# Patient Record
Sex: MALE | Race: OTHER
[De-identification: names, ages, dates, MRNs, and addresses within clinical notes are randomized per-mention and may not be internally consistent; named-entity substitution may affect disease eponyms.]

---

## 2018-07-04 ENCOUNTER — HOSPITAL ENCOUNTER (EMERGENCY)
Dept: HOSPITAL 80 - FED | Age: 23
Discharge: HOME | End: 2018-07-04
Payer: COMMERCIAL

## 2018-07-04 VITALS — SYSTOLIC BLOOD PRESSURE: 116 MMHG | DIASTOLIC BLOOD PRESSURE: 73 MMHG
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
## 2018-07-04 DIAGNOSIS — S16.1XXA: Primary | ICD-10-CM

## 2018-07-04 NOTE — EDPHY
H & P


Stated Complaint: c/o neck and back pain, had N/V/D 2 days ago none now.


Time Seen by Provider: 07/04/18 13:55





- Personal History


Current Tetanus Diphtheria and Acellular Pertussis (TDAP): Yes





- Medical/Surgical History


Hx Asthma: No


Hx Chronic Respiratory Disease: No


Hx Diabetes: No


Hx Cardiac Disease: No


Hx Renal Disease: No


Hx Cirrhosis: No


Hx Alcoholism: No


Hx HIV/AIDS: No


Hx Splenectomy or Spleen Trauma: No


Other PMH: hypothyroid, migraines





- Social History


Smoking Status: Never smoked


Constitutional: 


 Initial Vital Signs











Temperature (C)  36.7 C   07/04/18 12:56


 


Heart Rate  80   07/04/18 12:56


 


Respiratory Rate  18   07/04/18 12:56


 


Blood Pressure  116/73   07/04/18 12:56


 


O2 Sat (%)  97   07/04/18 12:56








 











O2 Delivery Mode               Room Air














Allergies/Adverse Reactions: 


 





No Known Allergies Allergy (Unverified 07/04/18 12:54)


 








Home Medications: 














 Medication  Instructions  Recorded


 


Cyproheptadine HCl  07/04/18


 


Ibuprofen  07/04/18


 


Ibuprofen [Motrin] 800 mg PO Q8 #20 tab 07/04/18


 


Levothyroxine  07/04/18














Medical Decision Making


ED Course/Re-evaluation: 





CHIEF COMPLAINT:  Right trapezius pain





HISTORY OF PRESENT ILLNESS:  Healthy 22-year-old male who had food poisoning a 

couple of days ago.  He had numerous episodes of vomiting and diarrhea.  He had 

some neck muscle pain yesterday when his symptoms were resolving that when he 

woke up this morning his right trapezius pain which is fairly significant.  He 

thinks he just injury date from retching so much but his mother encouraged him 

to come to the emergency department get checked out.  Patient denies fevers or 

chills.  Patient denies any more nausea vomiting diarrhea.  Patient feels 

completely fine and normal.





REVIEW OF SYSTEMS:  





A 10 point review of systems was performed and is negative with the exception 

of the elements mentioned in the history of present illness.





PHYSICAL EXAM:  





HR, BP, O2 Sat, RR.  Temp noted


General Appearance:  Alert, well hydrated, appropriate, and non-toxic appearing.


Head:  Atraumatic without scalp tenderness or obvious injury


Eyes:  Pupils equal, round, reactive to light and accommodation, EOMI, no trauma

, no injection.


Ears:  Clear bilaterally, no perforation, normal landmarks


Nose:  Atraumatic, no rhinorrhea, clear.


Throat:  There is no erythema or exudates, no lesions, normal tonsils, mucus 

membranes moist.


Neck:  Supple, 2+ carotid upstroke, nontender, no lymphadenopathy.


Respiratory:  No retractions, no distress, no wheezes, and no accessory muscle 

use.  Lungs are clear to auscultation bilaterally.


Cardiovascular:  Regular rate and rhythm, no murmurs, rubs, or gallops. 

Bilateral carotid, radial, dorsalis pedis, and posterior tibial pulses intact. 

Good capillary refill all extremities.


Gastrointestinal:  Abdomen is soft, nontender, non-distended, no masses, no 

rebound, no guarding, no peritoneal signs.


Musculoskeletal:  Pain over the right trapezius especially with palpation and 

turning of the neck.  No evidence of meningismus.  Otherwise,  Normal active 

ROM of all extremities, atraumatic.


Neurological:  Alert, appropriate, and interactive.  The patient has normal 

DTRs and non-focal cranial nerves, motor, sensory, and cerebellar exam.


Skin:  No rashes, good turgor, no nodules on palpation.





Past medical history:  None


Past surgical history:  None


Family history:  Noncontributory


Social history:  Single, employed, does not abuse tobacco drugs or alcohol





DIAGNOSTICS/PROCEDURES/CRITICAL CARE TIME:  


None necessary





DIFFERENTIAL DIAGNOSIS:   Includes but is not limited to:  Neck strain, neck 

sprain, meningitis, jumped facet, osseous injury





MEDICAL DECISION MAKING:  This patient has a mild strain of his right trapezius 

from violent vomiting a couple days ago when he had food poisoning.  He feels 

completely better has no systemic illness.  I can palpate the right trapezius 

and cause him to almost jump off the bed.  I have given this patient some 

Vicodin and Motrin and he will follow up with regular doctor.  There is no 

evidence of meningismus or any infectious process.





Departure





- Departure


Disposition: Home, Routine, Self-Care


Clinical Impression: 


Neck muscle strain


Qualifiers:


 Encounter type: initial encounter Qualified Code(s): S16.1XXA - Strain of 

muscle, fascia and tendon at neck level, initial encounter





Condition: Good


Instructions:  Cervical Strain (ED)


Referrals: 


KOEPPE,JOHN [Other] - As per Instructions


Prescriptions: 


Ibuprofen [Motrin] 800 mg PO Q8 #20 tab